# Patient Record
(demographics unavailable — no encounter records)

---

## 2024-10-28 NOTE — DISCUSSION/SUMMARY
[de-identified] : 10-YEAR HISTORY OF LEFT SHOULDER PAIN.  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  PATIENT HAD MRI 7 YEARS AGO?  THAT REPORTEDLY DID NOT SHOW ANYTHING  PATIENT HAS BEEN TREATED WITH PHYSICAL THERAPY FOR 8 MONTHS IN THE PAST WITH ONLY LIMITED RELIEF  PATIENT BELIEVES HIS PROBLEM IS TIGHTNESS IN THE TRAPEZIUS THAT THROWS OFF HIS SHOULDER FUNCTION  PATIENT HAS POSITIVE IMPINGEMENT SIGNS BUT ALSO IS SUSPICIOUS POSITIVE O'GERDA TEST FOR POSSIBLE SLAP LABRUM TEAR  MR ARTHROGRAM ORDERED TO EVALUATE INTEGRITY OF ROTATOR CUFF AND LABRUM  OFFICE VISIT TO DISCUSS MR ARTHROGRAM RESULTS AND TREATMENT OPTIONS

## 2024-10-28 NOTE — PHYSICAL EXAM
[de-identified] : PHYSICAL EXAM LEFT  SHOULDER  NECK EXAM  FROM NONTENDER  SPURLING  RIGHT=NEG, LEFT=NEG  NORMAL POSTURE  AROM 140 / 140 / 90 / 20 TENDER: SA REGION  ANTERIOR   SPECIAL TESTING : CALLES - POSITIVE  LIBAN - POSITIVE  SPEED TEST - POSITIVE  NGUYEN - POSITIVE  APPREHENSION AND SUPPRESSION - NEGATIVE   RC STRENGTH TESTING  SS:  5/5 SUB 5/5 IS     5/5 BICEPS  5/5  SENSATION  - GROSSLY INTACT   [de-identified] : LEFT SHOULDER XRAY (2 VIEWS - AP AND OUTLET) -   NO OBVIOUS FRACTURE , SEPARATION OR DISLOCATION  NO SIGNIFICANT OSTEOARTHRITIS,  TYPE 1B ACROMION  CSA=31.3

## 2024-10-28 NOTE — HISTORY OF PRESENT ILLNESS
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 PAIN IS LOCALIZED IS YOUR PROBLEM GETTING WORSE - SAME   IS THIS WORKERS COMPENSATION INJURY/ NO FAULT: NO  PAIN IS INTERMITTENT PAIN   WITHOUT MOVEMENT 1/10 WITH MOVEMENT 8/10 DESCRIPTION OF PAIN: DULL WORSE AT PARTICULAR TIME OF DAY: WORSE IN THE MORNING PRIOR/CURRENT TREATMENTS: OTC MEDICATIONS HELPED A LITTLE WHAT ACTIVITIES MAKE IT WORSE: OVER HEADLIFTING, REACHING BEHIND ANY SYMPTOMS: NONE ASSOCIATED CLICKING, TINGLING    PREVIOUS DISLOCATION- NO  HAS HAD PHYSICAL THERAPY WITHOUT RELIEF- YES NOT HELPFUL HAS HAD PREVIOUS SURGERY- NO  HAS HAD PREVIOUS INJECTION - NO HAS HAD PREVIOUS IMAGING - NO